# Patient Record
Sex: FEMALE | Race: BLACK OR AFRICAN AMERICAN | NOT HISPANIC OR LATINO | URBAN - METROPOLITAN AREA
[De-identification: names, ages, dates, MRNs, and addresses within clinical notes are randomized per-mention and may not be internally consistent; named-entity substitution may affect disease eponyms.]

---

## 2017-08-17 ENCOUNTER — EMERGENCY (EMERGENCY)
Facility: HOSPITAL | Age: 32
LOS: 1 days | Discharge: HOME | End: 2017-08-17
Attending: EMERGENCY MEDICINE | Admitting: EMERGENCY MEDICINE
Payer: COMMERCIAL

## 2017-08-17 VITALS
RESPIRATION RATE: 24 BRPM | SYSTOLIC BLOOD PRESSURE: 111 MMHG | HEART RATE: 85 BPM | TEMPERATURE: 98 F | DIASTOLIC BLOOD PRESSURE: 73 MMHG | OXYGEN SATURATION: 98 %

## 2017-08-17 VITALS
DIASTOLIC BLOOD PRESSURE: 68 MMHG | TEMPERATURE: 98 F | HEART RATE: 79 BPM | RESPIRATION RATE: 16 BRPM | SYSTOLIC BLOOD PRESSURE: 105 MMHG | OXYGEN SATURATION: 100 %

## 2017-08-17 DIAGNOSIS — O07.4 FAILED ATTEMPTED TERMINATION OF PREGNANCY WITHOUT COMPLICATION: ICD-10-CM

## 2017-08-17 LAB
ANION GAP SERPL CALC-SCNC: 16 MMOL/L — SIGNIFICANT CHANGE UP (ref 5–17)
APTT BLD: 29.3 SEC — SIGNIFICANT CHANGE UP (ref 27.5–37.4)
BASOPHILS # BLD AUTO: 0.1 K/UL — SIGNIFICANT CHANGE UP (ref 0–0.2)
BASOPHILS NFR BLD AUTO: 0.4 % — SIGNIFICANT CHANGE UP (ref 0–2)
BLD GP AB SCN SERPL QL: NEGATIVE — SIGNIFICANT CHANGE UP
BUN SERPL-MCNC: 16 MG/DL — SIGNIFICANT CHANGE UP (ref 7–23)
CALCIUM SERPL-MCNC: 10 MG/DL — SIGNIFICANT CHANGE UP (ref 8.4–10.5)
CHLORIDE SERPL-SCNC: 104 MMOL/L — SIGNIFICANT CHANGE UP (ref 96–108)
CO2 SERPL-SCNC: 19 MMOL/L — LOW (ref 22–31)
CREAT SERPL-MCNC: 0.8 MG/DL — SIGNIFICANT CHANGE UP (ref 0.5–1.3)
EOSINOPHIL # BLD AUTO: 0 K/UL — SIGNIFICANT CHANGE UP (ref 0–0.5)
EOSINOPHIL NFR BLD AUTO: 0.1 % — SIGNIFICANT CHANGE UP (ref 0–6)
GLUCOSE SERPL-MCNC: 114 MG/DL — HIGH (ref 70–99)
HCG SERPL-ACNC: 1511 MIU/ML — SIGNIFICANT CHANGE UP
HCT VFR BLD CALC: 32.3 % — LOW (ref 34.5–45)
HCT VFR BLD CALC: 37.8 % — SIGNIFICANT CHANGE UP (ref 34.5–45)
HGB BLD-MCNC: 10.9 G/DL — LOW (ref 11.5–15.5)
HGB BLD-MCNC: 12.3 G/DL — SIGNIFICANT CHANGE UP (ref 11.5–15.5)
INR BLD: 1.18 RATIO — HIGH (ref 0.88–1.16)
LYMPHOCYTES # BLD AUTO: 18.2 % — SIGNIFICANT CHANGE UP (ref 13–44)
LYMPHOCYTES # BLD AUTO: 2.3 K/UL — SIGNIFICANT CHANGE UP (ref 1–3.3)
MCHC RBC-ENTMCNC: 28.2 PG — SIGNIFICANT CHANGE UP (ref 27–34)
MCHC RBC-ENTMCNC: 29.3 PG — SIGNIFICANT CHANGE UP (ref 27–34)
MCHC RBC-ENTMCNC: 32.6 GM/DL — SIGNIFICANT CHANGE UP (ref 32–36)
MCHC RBC-ENTMCNC: 33.7 GM/DL — SIGNIFICANT CHANGE UP (ref 32–36)
MCV RBC AUTO: 86.7 FL — SIGNIFICANT CHANGE UP (ref 80–100)
MCV RBC AUTO: 87 FL — SIGNIFICANT CHANGE UP (ref 80–100)
MONOCYTES # BLD AUTO: 0.9 K/UL — SIGNIFICANT CHANGE UP (ref 0–0.9)
MONOCYTES NFR BLD AUTO: 7.2 % — SIGNIFICANT CHANGE UP (ref 2–14)
NEUTROPHILS # BLD AUTO: 9.4 K/UL — HIGH (ref 1.8–7.4)
NEUTROPHILS NFR BLD AUTO: 74.1 % — SIGNIFICANT CHANGE UP (ref 43–77)
PLATELET # BLD AUTO: 199 K/UL — SIGNIFICANT CHANGE UP (ref 150–400)
PLATELET # BLD AUTO: 237 K/UL — SIGNIFICANT CHANGE UP (ref 150–400)
POTASSIUM SERPL-MCNC: 4.2 MMOL/L — SIGNIFICANT CHANGE UP (ref 3.5–5.3)
POTASSIUM SERPL-SCNC: 4.2 MMOL/L — SIGNIFICANT CHANGE UP (ref 3.5–5.3)
PROTHROM AB SERPL-ACNC: 12.9 SEC — HIGH (ref 9.8–12.7)
RBC # BLD: 3.71 M/UL — LOW (ref 3.8–5.2)
RBC # BLD: 4.36 M/UL — SIGNIFICANT CHANGE UP (ref 3.8–5.2)
RBC # FLD: 13.1 % — SIGNIFICANT CHANGE UP (ref 10.3–14.5)
RBC # FLD: 13.1 % — SIGNIFICANT CHANGE UP (ref 10.3–14.5)
RH IG SCN BLD-IMP: POSITIVE — SIGNIFICANT CHANGE UP
RH IG SCN BLD-IMP: POSITIVE — SIGNIFICANT CHANGE UP
SODIUM SERPL-SCNC: 139 MMOL/L — SIGNIFICANT CHANGE UP (ref 135–145)
WBC # BLD: 12.7 K/UL — HIGH (ref 3.8–10.5)
WBC # BLD: 13.1 K/UL — HIGH (ref 3.8–10.5)
WBC # FLD AUTO: 12.7 K/UL — HIGH (ref 3.8–10.5)
WBC # FLD AUTO: 13.1 K/UL — HIGH (ref 3.8–10.5)

## 2017-08-17 PROCEDURE — 76817 TRANSVAGINAL US OBSTETRIC: CPT | Mod: 26

## 2017-08-17 PROCEDURE — 88305 TISSUE EXAM BY PATHOLOGIST: CPT | Mod: 26

## 2017-08-17 PROCEDURE — 85027 COMPLETE CBC AUTOMATED: CPT

## 2017-08-17 PROCEDURE — 86850 RBC ANTIBODY SCREEN: CPT

## 2017-08-17 PROCEDURE — 96374 THER/PROPH/DIAG INJ IV PUSH: CPT

## 2017-08-17 PROCEDURE — 99285 EMERGENCY DEPT VISIT HI MDM: CPT | Mod: 25

## 2017-08-17 PROCEDURE — 84702 CHORIONIC GONADOTROPIN TEST: CPT

## 2017-08-17 PROCEDURE — 85610 PROTHROMBIN TIME: CPT

## 2017-08-17 PROCEDURE — 99284 EMERGENCY DEPT VISIT MOD MDM: CPT | Mod: 25

## 2017-08-17 PROCEDURE — 80048 BASIC METABOLIC PNL TOTAL CA: CPT

## 2017-08-17 PROCEDURE — 86900 BLOOD TYPING SEROLOGIC ABO: CPT

## 2017-08-17 PROCEDURE — 86901 BLOOD TYPING SEROLOGIC RH(D): CPT

## 2017-08-17 PROCEDURE — 85730 THROMBOPLASTIN TIME PARTIAL: CPT

## 2017-08-17 PROCEDURE — 88305 TISSUE EXAM BY PATHOLOGIST: CPT

## 2017-08-17 PROCEDURE — 93975 VASCULAR STUDY: CPT

## 2017-08-17 PROCEDURE — 93975 VASCULAR STUDY: CPT | Mod: 26

## 2017-08-17 PROCEDURE — 96375 TX/PRO/DX INJ NEW DRUG ADDON: CPT

## 2017-08-17 PROCEDURE — 76817 TRANSVAGINAL US OBSTETRIC: CPT

## 2017-08-17 RX ORDER — ACETAMINOPHEN 500 MG
1000 TABLET ORAL ONCE
Qty: 0 | Refills: 0 | Status: COMPLETED | OUTPATIENT
Start: 2017-08-17 | End: 2017-08-17

## 2017-08-17 RX ORDER — SODIUM CHLORIDE 9 MG/ML
1000 INJECTION INTRAMUSCULAR; INTRAVENOUS; SUBCUTANEOUS ONCE
Qty: 0 | Refills: 0 | Status: COMPLETED | OUTPATIENT
Start: 2017-08-17 | End: 2017-08-17

## 2017-08-17 RX ORDER — MORPHINE SULFATE 50 MG/1
4 CAPSULE, EXTENDED RELEASE ORAL ONCE
Qty: 0 | Refills: 0 | Status: DISCONTINUED | OUTPATIENT
Start: 2017-08-17 | End: 2017-08-17

## 2017-08-17 RX ORDER — CEFAZOLIN SODIUM 1 G
1000 VIAL (EA) INJECTION ONCE
Qty: 0 | Refills: 0 | Status: COMPLETED | OUTPATIENT
Start: 2017-08-17 | End: 2017-08-17

## 2017-08-17 RX ORDER — MORPHINE SULFATE 50 MG/1
2 CAPSULE, EXTENDED RELEASE ORAL ONCE
Qty: 0 | Refills: 0 | Status: DISCONTINUED | OUTPATIENT
Start: 2017-08-17 | End: 2017-08-17

## 2017-08-17 RX ADMIN — Medication 1000 MILLIGRAM(S): at 02:33

## 2017-08-17 RX ADMIN — SODIUM CHLORIDE 1000 MILLILITER(S): 9 INJECTION INTRAMUSCULAR; INTRAVENOUS; SUBCUTANEOUS at 06:19

## 2017-08-17 RX ADMIN — MORPHINE SULFATE 2 MILLIGRAM(S): 50 CAPSULE, EXTENDED RELEASE ORAL at 01:43

## 2017-08-17 RX ADMIN — MORPHINE SULFATE 2 MILLIGRAM(S): 50 CAPSULE, EXTENDED RELEASE ORAL at 02:34

## 2017-08-17 RX ADMIN — SODIUM CHLORIDE 1000 MILLILITER(S): 9 INJECTION INTRAMUSCULAR; INTRAVENOUS; SUBCUTANEOUS at 01:43

## 2017-08-17 RX ADMIN — Medication 100 MILLIGRAM(S): at 06:38

## 2017-08-17 RX ADMIN — Medication 400 MILLIGRAM(S): at 01:44

## 2017-08-17 NOTE — ED PROVIDER NOTE - OBJECTIVE STATEMENT
33yo female  at 11w as per LMP p/w miscarriage and vaginal bleeding. Pt states [facility] obgyn stated she had blighted ovum on US 3w ago. C/o 10/10 midline suprapubic pain and cramping. Vaginal bleeding today, 1 pad, +large clot, possible tissue. No history of STDs or vaginal discharge.  Denies fever, chills, chest pain, dyspnea, palpitations, dizziness, weakness, recent cough, nausea, vomiting, diarrhea, bladder and bowel problems, leg swelling, sick contact, recent travel. 33yo female  at 11w as per LMP 10/29/17 p/w miscarriage and vaginal bleeding. Pt states [facility] obgyn stated she had blighted ovum on US 3w ago. C/o 10/10 midline suprapubic pain and cramping. Vaginal bleeding today, 1 pad, +large clot, possible tissue. No history of STDs or vaginal discharge.  Denies fever, chills, chest pain, dyspnea, palpitations, dizziness, weakness, recent cough, nausea, vomiting, diarrhea, bladder and bowel problems, leg swelling, sick contact, recent travel.

## 2017-08-17 NOTE — ED ADULT NURSE REASSESSMENT NOTE - NS ED NURSE REASSESS COMMENT FT1
patient returned from using the bathroom and reports that she passed a "large white/yellow clot". States she thinks she passed everything.

## 2017-08-17 NOTE — ED PROVIDER NOTE - ATTENDING CONTRIBUTION TO CARE
I was physically present for the E/M service provided. I agree with above history, physical, and plan which I have reviewed and edited where appropriate. I was physically present for the key portions of the service provided.    33yo female  at 11w as per LMP p/w vaginal bleeding. Dx with blighted ovum 3 weeks ago.  -Moderate distress due to pain, suprapubic TTP, abdomen soft  -r/o miscarriage vs retained products: US, T&S, H&H, Gyn c/s  -IVF and pain control

## 2017-08-17 NOTE — CONSULT NOTE ADULT - ASSESSMENT
33yo  at 11wks with incomplete     Patient is afebrile, hemodynamically stable, with minimal bleeding. No evidence of intrauterine products. Exam and imaging significant for retained endocervical product/blood. Patient counseled regarding management options, opts for cytotec.

## 2017-08-17 NOTE — ED ADULT NURSE REASSESSMENT NOTE - NS ED NURSE REASSESS COMMENT FT1
received report from Saranya DAO. pt resting comfortably in stretcher. A&Ox4. VSS. pt reports pain, 3/10 in abd, non radiating, cramping. heat packs provided for comfort. pt pending discharge. plan of care discussed. safety and comfort measures maintained.

## 2017-08-17 NOTE — ED ADULT NURSE REASSESSMENT NOTE - NS ED NURSE REASSESS COMMENT FT1
Pt discharged as per MD order, d/c instructions provided; pt verbalized understanding; VSS at time of discharge. IV lock removed. A&Ox4. Ambulating without difficulty.

## 2017-08-17 NOTE — ED PROVIDER NOTE - MEDICAL DECISION MAKING DETAILS
Frandy Byrd, PGY2: 32F  at 11w p/w abd cramping in setting of miscarriage. Morphine 2mg and tylenol. IVF. Labs. TVUS. Gyn consult

## 2017-08-17 NOTE — CONSULT NOTE ADULT - SUBJECTIVE AND OBJECTIVE BOX
HPI:    32y G_P_, LMP      presents with       OBHx:  GynHx:   PMH:  PSH:  All:  Meds:   SocialHx:     Vital Signs Last 24 Hrs  T(C): 36.7 (17 Aug 2017 01:38), Max: 36.7 (17 Aug 2017 01:38)  T(F): 98 (17 Aug 2017 01:38), Max: 98 (17 Aug 2017 01:38)  HR: 73 (17 Aug 2017 03:53) (73 - 85)  BP: 100/60 (17 Aug 2017 03:53) (100/60 - 111/73)  BP(mean): --  RR: 18 (17 Aug 2017 03:53) (18 - 24)  SpO2: 100% (17 Aug 2017 03:53) (98% - 100%)    PE:  Gen: Comfortable, NAD  CV: RRR  Pulm: CTAB  Abd: Soft, NT  Ext: No edema or tenderness bilaterally  Spec Exam:    LABS:                        12.3   12.7  )-----------( 237      ( 17 Aug 2017 01:47 )             37.8     08-17    139  |  104  |  16  ----------------------------<  114<H>  4.2   |  19<L>  |  0.80    Ca    10.0      17 Aug 2017 01:47      PT/INR - ( 17 Aug 2017 01:47 )   PT: 12.9 sec;   INR: 1.18 ratio         PTT - ( 17 Aug 2017 01:47 )  PTT:29.3 sec      RADIOLOGY & ADDITIONAL STUDIES:      A/P: 32y G P   who present with  -  -      So Lares PGY4 HPI:    32y  LMP 17 with known missed ab presenting to ED with       OBHx: G1  GynHx: reg menses, no f/c/abnl Paps or STIs  PMH:  PSH:  All:  Meds:   SocialHx:     Vital Signs Last 24 Hrs  T(C): 36.7 (17 Aug 2017 01:38), Max: 36.7 (17 Aug 2017 01:38)  T(F): 98 (17 Aug 2017 01:38), Max: 98 (17 Aug 2017 01:38)  HR: 73 (17 Aug 2017 03:53) (73 - 85)  BP: 100/60 (17 Aug 2017 03:53) (100/60 - 111/73)  BP(mean): --  RR: 18 (17 Aug 2017 03:53) (18 - 24)  SpO2: 100% (17 Aug 2017 03:53) (98% - 100%)    PE:  Gen: Comfortable, NAD  CV: RRR  Pulm: CTAB  Abd: Soft, NT  Ext: No edema or tenderness bilaterally  Spec Exam:    LABS:                        12.3   12.7  )-----------( 237      ( 17 Aug 2017 01:47 )             37.8     08-17    139  |  104  |  16  ----------------------------<  114<H>  4.2   |  19<L>  |  0.80    Ca    10.0      17 Aug 2017 01:47      PT/INR - ( 17 Aug 2017 01:47 )   PT: 12.9 sec;   INR: 1.18 ratio         PTT - ( 17 Aug 2017 01:47 )  PTT:29.3 sec      RADIOLOGY & ADDITIONAL STUDIES:      A/P: 32y G P   who present with  -  -      So Luda PGY4 HPI:    32y  @ 11wks by LMP 17 with known missed ab 3 weeks ago (~8wks) brought in by ambulance for vaginal bleeding and severe abdominal cramping. Patient was informed of blighted ovum diagnosis by sono 3 weeks ago, and she opted for medical management at the time. States that she was planning to take cytotec today; however, on her way home from work, she started bleeding heavily, felt lightheaded, and had to park on the [place]. She denies loss of consciousness but felt very weak at the time.  In ED, patient passed products in the bathroom, and reports significant pain relief afterwards. Bleeding is now comparable to a light period and she denies abdominal cramping. No fever, chills, nausea, vomiting, lightheadedness or dizziness.  This pregnancy was not planned but it was desired.  GYN Provider is Dr. Brent Herron ([facility])      OBHx: G1  GynHx: reg menses, no f/c/abnl Paps or STIs  PMH: none  PSH: none  All: NKDA  Meds: none  SocialHx: denies smoking, alcohol, or other drugs    Vital Signs Last 24 Hrs  T(C): 36.7 (17 Aug 2017 01:38), Max: 36.7 (17 Aug 2017 01:38)  T(F): 98 (17 Aug 2017 01:38), Max: 98 (17 Aug 2017 01:38)  HR: 73 (17 Aug 2017 03:53) (73 - 85)  BP: 100/60 (17 Aug 2017 03:53) (100/60 - 111/73)  BP(mean): --  RR: 18 (17 Aug 2017 03:53) (18 - 24)  SpO2: 100% (17 Aug 2017 03:53) (98% - 100%)    PE:  Gen: laying in bed comfortable  CV: RRR, no murmurs  Pulm: CTAB  Abd: normoactive bowel sounds, soft, nontender  Ext: No edema or tenderness bilaterally  Spec Exam: cervix visually dilated with products at os, sent to pathology. Small clots on Valsalva, light bleeding  VE: ~10cm uterus nontender, no CMT, cervix 1cm dilated, no adnexal fullness or tenderness    LABS:                        12.3   12.7  )-----------( 237      ( 17 Aug 2017 01:47 )             37.8     -    139  |  104  |  16  ----------------------------<  114<H>  4.2   |  19<L>  |  0.80    Ca    10.0      17 Aug 2017 01:47      PT/INR - ( 17 Aug 2017 01:47 )   PT: 12.9 sec;   INR: 1.18 ratio         PTT - ( 17 Aug 2017 01:47 )  PTT:29.3 sec      RADIOLOGY & ADDITIONAL STUDIES:    EXAM:  US OB TRANSVAGINAL                          EXAM:  US DPLX PELVIC                          PROCEDURE DATE:  2017        INTERPRETATION:  CLINICAL INFORMATION: 11 weeks pregnant with vaginal   bleeding and passage of tissue. Evaluate for retained products of   conception. Beta-hCG of 1511. Diagnosed with a blighted ovum 3 weeks ago.    LMP: May 29, 2017.    Gestational age based on LMP: 11 weeks and 3 days.    COMPARISON: None available.    TECHNIQUE: Transabdominal and Endovaginal pelvic sonogram. Color and   Spectral Doppler was performed.    FINDINGS:    Uterus: 9.9 x 8.2 x 7.2 cm. Multiple uterine myomas are identified which   are subserosal and intramural in location. For reference, the largest   subserosal anterior fibroid measures 3.7 x 4.3 x 4.4 cm. The cervix is   open and distended with heterogeneously echogenic material, which may   represent blood products.  Endometrium: Thickened measuring 22 mm. no discrete vascularity   identified utilizing color or spectral Doppler. No intrauterine   gestational sac visualized.    Right ovary: 1.8 x 1.2 x 1.8 cm. Within normal limits. Blood flow is   demonstrated utilizing color and spectral Doppler. Color spectral Doppler   flow.  Left ovary: 3.2 x 1.5 x 2.1 cm, inclusive of a 1.3 x 1.2 x 0.9 cm corpus   luteum. Ovarian blood flow is demonstrated utilizing color and spectral   Doppler.  Free fluid: Trace right adnexal free fluid.    IMPRESSION:    No visualized intrauterine gestation. Heterogeneously thickened   endometrium and endocervical canal distended with heterogeneously   echogenic material, which may represent blood products. Findings may be   seen in the setting of spontaneous . Serial beta-hCG should be   obtained to ensure proper downtrending. R2 GYN Consult Note    32y  @ 11wks by LMP 17 with known missed ab 3 weeks ago (~8wks) brought in by ambulance for vaginal bleeding and severe abdominal cramping. Patient was informed of blighted ovum diagnosis by sono 3 weeks ago, and she opted for medical management at the time. States that she was planning to take cytotec today; however, on her way home from work, she started bleeding heavily, felt lightheaded, and had to park on the [place] bridge. She denies loss of consciousness but felt very weak at the time.  In ED, patient passed products in the bathroom, and reports significant pain relief afterwards. Bleeding is now comparable to a light period and she denies abdominal cramping. No fever, chills, nausea, vomiting, lightheadedness or dizziness.  This pregnancy was not planned but it was desired.  GYN Provider is Dr. Brent Herron ([facility])      OBHx: G1  GynHx: reg menses, no f/c/abnl Paps or STIs  PMH: none  PSH: none  All: NKDA  Meds: none  SocialHx: denies smoking, alcohol, or other drugs    Vital Signs Last 24 Hrs  T(C): 36.7 (17 Aug 2017 01:38), Max: 36.7 (17 Aug 2017 01:38)  T(F): 98 (17 Aug 2017 01:38), Max: 98 (17 Aug 2017 01:38)  HR: 73 (17 Aug 2017 03:53) (73 - 85)  BP: 100/60 (17 Aug 2017 03:53) (100/60 - 111/73)  BP(mean): --  RR: 18 (17 Aug 2017 03:53) (18 - 24)  SpO2: 100% (17 Aug 2017 03:53) (98% - 100%)    PE:  Gen: laying in bed comfortable  CV: RRR, no murmurs  Pulm: CTAB  Abd: normoactive bowel sounds, soft, nontender  Ext: No edema or tenderness bilaterally  Spec Exam: cervix visually dilated with products at os, sent to pathology. Small clots on Valsalva, light bleeding  VE: ~10cm uterus nontender, no CMT, cervix 1cm dilated, no adnexal fullness or tenderness    LABS:                        12.3   12.7  )-----------( 237      ( 17 Aug 2017 01:47 )             37.8     08-    139  |  104  |  16  ----------------------------<  114<H>  4.2   |  19<L>  |  0.80    Ca    10.0      17 Aug 2017 01:47      PT/INR - ( 17 Aug 2017 01:47 )   PT: 12.9 sec;   INR: 1.18 ratio         PTT - ( 17 Aug 2017 01:47 )  PTT:29.3 sec      RADIOLOGY & ADDITIONAL STUDIES:    EXAM:  US OB TRANSVAGINAL                          EXAM:  US DPLX PELVIC                          PROCEDURE DATE:  2017        INTERPRETATION:  CLINICAL INFORMATION: 11 weeks pregnant with vaginal   bleeding and passage of tissue. Evaluate for retained products of   conception. Beta-hCG of 1511. Diagnosed with a blighted ovum 3 weeks ago.    LMP: May 29, 2017.    Gestational age based on LMP: 11 weeks and 3 days.    COMPARISON: None available.    TECHNIQUE: Transabdominal and Endovaginal pelvic sonogram. Color and   Spectral Doppler was performed.    FINDINGS:    Uterus: 9.9 x 8.2 x 7.2 cm. Multiple uterine myomas are identified which   are subserosal and intramural in location. For reference, the largest   subserosal anterior fibroid measures 3.7 x 4.3 x 4.4 cm. The cervix is   open and distended with heterogeneously echogenic material, which may   represent blood products.  Endometrium: Thickened measuring 22 mm. no discrete vascularity   identified utilizing color or spectral Doppler. No intrauterine   gestational sac visualized.    Right ovary: 1.8 x 1.2 x 1.8 cm. Within normal limits. Blood flow is   demonstrated utilizing color and spectral Doppler. Color spectral Doppler   flow.  Left ovary: 3.2 x 1.5 x 2.1 cm, inclusive of a 1.3 x 1.2 x 0.9 cm corpus   luteum. Ovarian blood flow is demonstrated utilizing color and spectral   Doppler.  Free fluid: Trace right adnexal free fluid.    IMPRESSION:    No visualized intrauterine gestation. Heterogeneously thickened   endometrium and endocervical canal distended with heterogeneously   echogenic material, which may represent blood products. Findings may be   seen in the setting of spontaneous . Serial beta-hCG should be   obtained to ensure proper downtrending.

## 2017-08-17 NOTE — ED ADULT NURSE NOTE - OBJECTIVE STATEMENT
33 y/o female, , 11 weeks pregnant, BIBA c/o vaginal bleeding. Patient states she has been spotting for the past 4-5 days. Reports tonight about 45 minutes prior to arrival she had sudden sharp/stabbing pain to her lower abdomen and "a big gush of blood". Patient states she had a recent sonogram and was told she has a "blighted ovum" and was aware that she was going to have a miscarriage eventually. Patient presents to ED visibly uncomfortable, shaking and c/o sever pain. Denies dizziness, N/V, vision changes, weakness, fever, chills. States she recalls passing a large "blood clot" prior to arrival. Lungs clear b/l. Abdomen soft, +tenderness with palpation of lower abdomen b/l. MD at bedside for eval. Safety and comfort maintained.

## 2017-08-17 NOTE — ED PROVIDER NOTE - CARE PLAN
Principal Discharge DX:	Incomplete  without complication  Instructions for follow-up, activity and diet:	1. Follow up with your ob/gyn within 2-3days for reevaluation.  2.  Return to the Emergency Department for worsening, progressive or any other concerning symptoms.

## 2017-08-17 NOTE — ED PROVIDER NOTE - PLAN OF CARE
1. Follow up with your ob/gyn within 2-3days for reevaluation.  2.  Return to the Emergency Department for worsening, progressive or any other concerning symptoms.

## 2017-08-17 NOTE — ED PROVIDER NOTE - PROGRESS NOTE DETAILS
Pt passed conception tissue and clots while in bathroom, majority of which was flushed. I was shown the last tissue that passed and it is consistent with products of conception. Pt no longer in pain. Gyn evaluated patient, recommending cytotec and repeat cbc. If h/h stable, discharge with obgyn followup Patient seen by private ob/gyn at bedside, OK for DC home. Sylvia Malone DO

## 2017-08-18 LAB — CHROM ANALY OVERALL INTERP SPEC-IMP: SIGNIFICANT CHANGE UP

## 2017-09-01 LAB — SURGICAL PATHOLOGY STUDY: SIGNIFICANT CHANGE UP

## 2018-02-03 ENCOUNTER — EMERGENCY (EMERGENCY)
Facility: HOSPITAL | Age: 33
LOS: 1 days | Discharge: HOME | End: 2018-02-03
Attending: EMERGENCY MEDICINE | Admitting: EMERGENCY MEDICINE
Payer: COMMERCIAL

## 2018-02-03 VITALS
DIASTOLIC BLOOD PRESSURE: 76 MMHG | RESPIRATION RATE: 18 BRPM | HEART RATE: 112 BPM | SYSTOLIC BLOOD PRESSURE: 125 MMHG | HEIGHT: 63 IN | OXYGEN SATURATION: 100 % | WEIGHT: 156.97 LBS | TEMPERATURE: 99 F

## 2018-02-03 VITALS
SYSTOLIC BLOOD PRESSURE: 99 MMHG | DIASTOLIC BLOOD PRESSURE: 64 MMHG | RESPIRATION RATE: 17 BRPM | OXYGEN SATURATION: 100 % | TEMPERATURE: 98 F | HEART RATE: 102 BPM

## 2018-02-03 DIAGNOSIS — R10.9 UNSPECIFIED ABDOMINAL PAIN: ICD-10-CM

## 2018-02-03 LAB
ALBUMIN SERPL ELPH-MCNC: 4.2 G/DL — SIGNIFICANT CHANGE UP (ref 3.3–5)
ALP SERPL-CCNC: 68 U/L — SIGNIFICANT CHANGE UP (ref 40–120)
ALT FLD-CCNC: 9 U/L RC — LOW (ref 10–45)
ANION GAP SERPL CALC-SCNC: 14 MMOL/L — SIGNIFICANT CHANGE UP (ref 5–17)
APPEARANCE UR: CLEAR — SIGNIFICANT CHANGE UP
AST SERPL-CCNC: 17 U/L — SIGNIFICANT CHANGE UP (ref 10–40)
BACTERIA # UR AUTO: ABNORMAL /HPF
BASE EXCESS BLDV CALC-SCNC: -1.1 MMOL/L — SIGNIFICANT CHANGE UP (ref -2–2)
BASOPHILS # BLD AUTO: 0 K/UL — SIGNIFICANT CHANGE UP (ref 0–0.2)
BASOPHILS NFR BLD AUTO: 0.1 % — SIGNIFICANT CHANGE UP (ref 0–2)
BILIRUB SERPL-MCNC: 0.2 MG/DL — SIGNIFICANT CHANGE UP (ref 0.2–1.2)
BILIRUB UR-MCNC: NEGATIVE — SIGNIFICANT CHANGE UP
BLD GP AB SCN SERPL QL: NEGATIVE — SIGNIFICANT CHANGE UP
BUN SERPL-MCNC: 14 MG/DL — SIGNIFICANT CHANGE UP (ref 7–23)
CA-I SERPL-SCNC: 1.22 MMOL/L — SIGNIFICANT CHANGE UP (ref 1.12–1.3)
CALCIUM SERPL-MCNC: 9.5 MG/DL — SIGNIFICANT CHANGE UP (ref 8.4–10.5)
CHLORIDE BLDV-SCNC: 107 MMOL/L — SIGNIFICANT CHANGE UP (ref 96–108)
CHLORIDE SERPL-SCNC: 103 MMOL/L — SIGNIFICANT CHANGE UP (ref 96–108)
CO2 BLDV-SCNC: 26 MMOL/L — SIGNIFICANT CHANGE UP (ref 22–30)
CO2 SERPL-SCNC: 23 MMOL/L — SIGNIFICANT CHANGE UP (ref 22–31)
COLOR SPEC: YELLOW — SIGNIFICANT CHANGE UP
CREAT SERPL-MCNC: 0.65 MG/DL — SIGNIFICANT CHANGE UP (ref 0.5–1.3)
DIFF PNL FLD: ABNORMAL
EOSINOPHIL # BLD AUTO: 0 K/UL — SIGNIFICANT CHANGE UP (ref 0–0.5)
EOSINOPHIL NFR BLD AUTO: 0 % — SIGNIFICANT CHANGE UP (ref 0–6)
EPI CELLS # UR: SIGNIFICANT CHANGE UP /HPF
GAS PNL BLDV: 137 MMOL/L — SIGNIFICANT CHANGE UP (ref 136–145)
GAS PNL BLDV: SIGNIFICANT CHANGE UP
GAS PNL BLDV: SIGNIFICANT CHANGE UP
GLUCOSE BLDV-MCNC: 83 MG/DL — SIGNIFICANT CHANGE UP (ref 70–99)
GLUCOSE SERPL-MCNC: 87 MG/DL — SIGNIFICANT CHANGE UP (ref 70–99)
GLUCOSE UR QL: NEGATIVE — SIGNIFICANT CHANGE UP
HCG SERPL-ACNC: HIGH MIU/ML (ref 5–24)
HCO3 BLDV-SCNC: 24 MMOL/L — SIGNIFICANT CHANGE UP (ref 21–29)
HCT VFR BLD CALC: 35.9 % — SIGNIFICANT CHANGE UP (ref 34.5–45)
HCT VFR BLDA CALC: 38 % — LOW (ref 39–50)
HGB BLD CALC-MCNC: 12.3 G/DL — SIGNIFICANT CHANGE UP (ref 11.5–15.5)
HGB BLD-MCNC: 12.5 G/DL — SIGNIFICANT CHANGE UP (ref 11.5–15.5)
KETONES UR-MCNC: ABNORMAL
LACTATE BLDV-MCNC: 0.9 MMOL/L — SIGNIFICANT CHANGE UP (ref 0.7–2)
LEUKOCYTE ESTERASE UR-ACNC: NEGATIVE — SIGNIFICANT CHANGE UP
LYMPHOCYTES # BLD AUTO: 1.3 K/UL — SIGNIFICANT CHANGE UP (ref 1–3.3)
LYMPHOCYTES # BLD AUTO: 9.3 % — LOW (ref 13–44)
MCHC RBC-ENTMCNC: 30 PG — SIGNIFICANT CHANGE UP (ref 27–34)
MCHC RBC-ENTMCNC: 34.8 GM/DL — SIGNIFICANT CHANGE UP (ref 32–36)
MCV RBC AUTO: 86.1 FL — SIGNIFICANT CHANGE UP (ref 80–100)
MONOCYTES # BLD AUTO: 0.9 K/UL — SIGNIFICANT CHANGE UP (ref 0–0.9)
MONOCYTES NFR BLD AUTO: 6.9 % — SIGNIFICANT CHANGE UP (ref 2–14)
NEUTROPHILS # BLD AUTO: 11.4 K/UL — HIGH (ref 1.8–7.4)
NEUTROPHILS NFR BLD AUTO: 83.7 % — HIGH (ref 43–77)
NITRITE UR-MCNC: NEGATIVE — SIGNIFICANT CHANGE UP
OTHER CELLS CSF MANUAL: 6 ML/DL — LOW (ref 18–22)
PCO2 BLDV: 46 MMHG — SIGNIFICANT CHANGE UP (ref 35–50)
PH BLDV: 7.34 — LOW (ref 7.35–7.45)
PH UR: 6 — SIGNIFICANT CHANGE UP (ref 5–8)
PLATELET # BLD AUTO: 216 K/UL — SIGNIFICANT CHANGE UP (ref 150–400)
PO2 BLDV: 23 MMHG — LOW (ref 25–45)
POTASSIUM BLDV-SCNC: 3.4 MMOL/L — LOW (ref 3.5–5)
POTASSIUM SERPL-MCNC: 3.7 MMOL/L — SIGNIFICANT CHANGE UP (ref 3.5–5.3)
POTASSIUM SERPL-SCNC: 3.7 MMOL/L — SIGNIFICANT CHANGE UP (ref 3.5–5.3)
PROT SERPL-MCNC: 7.9 G/DL — SIGNIFICANT CHANGE UP (ref 6–8.3)
PROT UR-MCNC: SIGNIFICANT CHANGE UP
RBC # BLD: 4.17 M/UL — SIGNIFICANT CHANGE UP (ref 3.8–5.2)
RBC # FLD: 13.8 % — SIGNIFICANT CHANGE UP (ref 10.3–14.5)
RBC CASTS # UR COMP ASSIST: ABNORMAL /HPF (ref 0–2)
RH IG SCN BLD-IMP: POSITIVE — SIGNIFICANT CHANGE UP
SAO2 % BLDV: 33 % — LOW (ref 67–88)
SODIUM SERPL-SCNC: 140 MMOL/L — SIGNIFICANT CHANGE UP (ref 135–145)
SP GR SPEC: 1.02 — SIGNIFICANT CHANGE UP (ref 1.01–1.02)
UROBILINOGEN FLD QL: NEGATIVE — SIGNIFICANT CHANGE UP
WBC # BLD: 13.6 K/UL — HIGH (ref 3.8–10.5)
WBC # FLD AUTO: 13.6 K/UL — HIGH (ref 3.8–10.5)
WBC UR QL: SIGNIFICANT CHANGE UP /HPF (ref 0–5)

## 2018-02-03 PROCEDURE — 86850 RBC ANTIBODY SCREEN: CPT

## 2018-02-03 PROCEDURE — 84702 CHORIONIC GONADOTROPIN TEST: CPT

## 2018-02-03 PROCEDURE — 99284 EMERGENCY DEPT VISIT MOD MDM: CPT | Mod: 25

## 2018-02-03 PROCEDURE — 82947 ASSAY GLUCOSE BLOOD QUANT: CPT

## 2018-02-03 PROCEDURE — 85014 HEMATOCRIT: CPT

## 2018-02-03 PROCEDURE — 76830 TRANSVAGINAL US NON-OB: CPT

## 2018-02-03 PROCEDURE — 85027 COMPLETE CBC AUTOMATED: CPT

## 2018-02-03 PROCEDURE — 84132 ASSAY OF SERUM POTASSIUM: CPT

## 2018-02-03 PROCEDURE — 76856 US EXAM PELVIC COMPLETE: CPT

## 2018-02-03 PROCEDURE — 82435 ASSAY OF BLOOD CHLORIDE: CPT

## 2018-02-03 PROCEDURE — 80053 COMPREHEN METABOLIC PANEL: CPT

## 2018-02-03 PROCEDURE — 82330 ASSAY OF CALCIUM: CPT

## 2018-02-03 PROCEDURE — 76830 TRANSVAGINAL US NON-OB: CPT | Mod: 26

## 2018-02-03 PROCEDURE — 86900 BLOOD TYPING SEROLOGIC ABO: CPT

## 2018-02-03 PROCEDURE — 96376 TX/PRO/DX INJ SAME DRUG ADON: CPT

## 2018-02-03 PROCEDURE — 81001 URINALYSIS AUTO W/SCOPE: CPT

## 2018-02-03 PROCEDURE — 76856 US EXAM PELVIC COMPLETE: CPT | Mod: 26

## 2018-02-03 PROCEDURE — 84295 ASSAY OF SERUM SODIUM: CPT

## 2018-02-03 PROCEDURE — 83605 ASSAY OF LACTIC ACID: CPT

## 2018-02-03 PROCEDURE — 96374 THER/PROPH/DIAG INJ IV PUSH: CPT

## 2018-02-03 PROCEDURE — 82803 BLOOD GASES ANY COMBINATION: CPT

## 2018-02-03 PROCEDURE — 99285 EMERGENCY DEPT VISIT HI MDM: CPT

## 2018-02-03 PROCEDURE — 86901 BLOOD TYPING SEROLOGIC RH(D): CPT

## 2018-02-03 RX ORDER — INDOMETHACIN 50 MG
1 CAPSULE ORAL
Qty: 9 | Refills: 0 | OUTPATIENT
Start: 2018-02-03 | End: 2018-02-05

## 2018-02-03 RX ORDER — MORPHINE SULFATE 50 MG/1
2 CAPSULE, EXTENDED RELEASE ORAL ONCE
Qty: 0 | Refills: 0 | Status: DISCONTINUED | OUTPATIENT
Start: 2018-02-03 | End: 2018-02-03

## 2018-02-03 RX ORDER — KETOROLAC TROMETHAMINE 30 MG/ML
15 SYRINGE (ML) INJECTION ONCE
Qty: 0 | Refills: 0 | Status: DISCONTINUED | OUTPATIENT
Start: 2018-02-03 | End: 2018-02-03

## 2018-02-03 RX ORDER — SODIUM CHLORIDE 9 MG/ML
1000 INJECTION INTRAMUSCULAR; INTRAVENOUS; SUBCUTANEOUS ONCE
Qty: 0 | Refills: 0 | Status: COMPLETED | OUTPATIENT
Start: 2018-02-03 | End: 2018-02-03

## 2018-02-03 RX ADMIN — Medication 15 MILLIGRAM(S): at 17:45

## 2018-02-03 RX ADMIN — Medication 15 MILLIGRAM(S): at 23:38

## 2018-02-03 RX ADMIN — Medication 15 MILLIGRAM(S): at 19:36

## 2018-02-03 RX ADMIN — SODIUM CHLORIDE 1000 MILLILITER(S): 9 INJECTION INTRAMUSCULAR; INTRAVENOUS; SUBCUTANEOUS at 17:45

## 2018-02-03 RX ADMIN — Medication 15 MILLIGRAM(S): at 21:35

## 2018-02-03 NOTE — CONSULT NOTE ADULT - SUBJECTIVE AND OBJECTIVE BOX
CHAD ESQUIVEL  33y  Female [id number]    HPI:  34 y/o  s/p D&C 18 for elective termination at 12wk here for abdominal pain.  Patient states she woke up at 230 am in severe crampy abdominal pain.  States previous to that she only had mild pain.  Described pain as crampy, 10/10 in intensity.  States she took 400 mg of advil only once pain got "so bad she could no longer take it."       Name of GYN Physician:     POB:      Pgyn: Denies fibroids, cysts, endometriosis, STI's, Abnormal pap smears     Home meds:     Hospital Meds:   MEDICATIONS  (STANDING):    MEDICATIONS  (PRN):      Allergies    No Known Allergies    Intolerances        PAST MEDICAL & SURGICAL HISTORY:  No pertinent past medical history  No significant past surgical history      FAMILY HISTORY:  No pertinent family history in first degree relatives      Social History:  Denies smoking use, drug use, alcohol use.   +occasional social alcohol use    Vital Signs Last 24 Hrs  T(C): 36.7 (2018 16:42), Max: 37.1 (2018 16:03)  T(F): 98 (2018 16:42), Max: 98.7 (2018 16:03)  HR: 107 (2018 16:42) (107 - 112)  BP: 123/73 (2018 16:42) (123/73 - 125/76)  BP(mean): --  RR: 16 (2018 16:42) (16 - 18)  SpO2: 100% (2018 16:42) (100% - 100%)    Physical Exam:   General: sitting comftorably in bed, NAD   HEENT: neck supple, full ROM  CV: RR S1S2 no m/r/g  Lungs: CTA b/l, good air flow b/l   Back: No CVA tenderness  Abd: Soft, non-tender, non-distended.  Bowel sounds present.    :  No bleeding on pad.    External labia wnl.  Bimanual exam with no cervical motion tenderness, uterus wnl, adnexa non palpable b/l.  Cervix closed vs. Cervix dilated    cm   Speculum Exam: No active bleeding from os.  Physiologic discharge.    Ext: non-tender b/l, no edema     LABS:                              12.5   13.6  )-----------( 216      ( 2018 17:18 )             35.9           I&O's Detail          RADIOLOGY & ADDITIONAL STUDIES: CHAD ESQUIVEL  33y  Female [id number]    HPI:  32 y/o  s/p D&C 18 for elective termination at 12wk here for abdominal pain.  Patient states she woke up at 230 am in severe crampy abdominal pain.  States previous to that she only had mild pain.  Described pain as crampy, 10/10 in intensity.  States she took 400 mg of advil only once pain got "so bad she could no longer take it."  Reports bloating with the pain.  Denies fever, chills, n/v, diarrhea, constipation, urinary pain, burning, hesitancy.  States vaginal bleeding is mild- 1 pad per day.  Denies purulent vaginal discharge.    [facility] [place].      Name of GYN Physician: Dr. Herron    POB:  etop x 1, sab x 1     Pgyn: Denies cysts, endometriosis, STI's, Abnormal pap smears.  +fibroids dx after sab.     Home meds: denies     Allergies    No Known Allergies    Intolerances        PAST MEDICAL & SURGICAL HISTORY:  No pertinent past medical history  No significant past surgical history      FAMILY HISTORY:  No pertinent family history in first degree relatives      Social History:  Denies smoking use, drug use, alcohol use.       Vital Signs Last 24 Hrs  T(C): 36.7 (2018 16:42), Max: 37.1 (2018 16:03)  T(F): 98 (2018 16:42), Max: 98.7 (2018 16:03)  HR: 107 (2018 16:42) (107 - 112)  BP: 123/73 (2018 16:42) (123/73 - 125/76)  BP(mean): --  RR: 16 (2018 16:42) (16 - 18)  SpO2: 100% (2018 16:42) (100% - 100%)    Physical Exam:   General: sitting comfortably in bed, NAD   HEENT: neck supple, full ROM  Back: No CVA tenderness  Abd: Soft, tender to palpation in RLQ- no rebound or guarding  Bowel sounds present.    :  No bleeding on pad.    External labia wnl.  Bimanual exam with no cervical motion tenderness, uterus wnl, R adnexal and suprapubic tenderness.  Cervix closed  Speculum Exam: No active bleeding from os.  Old blood in vault.     Ext: non-tender b/l, no edema     LABS:                              12.5   13.6  )-----------( 216      ( 2018 17:18 )             35.9           I&O's Detail          RADIOLOGY & ADDITIONAL STUDIES:    Bedsdie sono performed: thin endometrial stripe.  large R pedunculated fibroid extending into RLQ with pain in taht area upon sonogram.

## 2018-02-03 NOTE — CONSULT NOTE ADULT - ASSESSMENT
34 y/o  s/p eTOP with D&C on  here for cramping s/p D&C.  Low concern for infection given low grade leukocytosis (normal in setting of recent instrumentation), lack of purulent discharge and lack of fever.  Likely cramping 2/2 uterine contractions s/p procedure w/ large fibroid uterus.

## 2018-02-03 NOTE — CONSULT NOTE ADULT - PROBLEM SELECTOR RECOMMENDATION 9
-f/u TVUS   -UA to assess UTI given suprapubic tenderness.   -IV fluid bolus given tachycardia  -GYN will follow     Guadalupe Negron PGY-2   d/w Dr. Herron

## 2018-02-03 NOTE — ED ADULT NURSE REASSESSMENT NOTE - NS ED NURSE REASSESS COMMENT FT1
Patient resting and comfortable. Patient reports pain in lower abdominal area. MD aware. VSS. Safety and comfort measures provided and maintained. Pending US.
1952 pt resting comfortably pending sono results/vss no distress/lab sent/gcruz

## 2018-02-03 NOTE — ED PROVIDER NOTE - MEDICAL DECISION MAKING DETAILS
34 y/o female h/o fibroids presenting with lower abdominal pain. pt s/p d&c a few days ago. on exam pt with lower abdominal pain. negaitve mcburrneys and negative psoas and history atypical for appendicitis. pocus shows multiple fibroids without evidence of sig free fluid. d/w ob, who suspect pain likely normal post op pain from d7\&C as well as seconary to large fibroids. will pain control and order tvus to further evaluate

## 2018-02-03 NOTE — ED PROVIDER NOTE - PROGRESS NOTE DETAILS
Attd:  Received sign out on patient pending TVUS and OBGYN opinion.  Full read of ultrasound pending at this time, however patient feeling better and very much desiring to go home.  Discussed with OBGYN - they reviewed US with radiology - only seeing fibroid, no concern for RPOC, recommending indomethacin for pain control.  Discussed with patient that final attending read of US pending and based off final read she may need to return to the hospital, she is comfortable going home with this still pending - well appearing, believe this is reasonable, will discharge with OBGYN follow up.  Reddy Camargo M.D.

## 2018-02-03 NOTE — ED ADULT NURSE NOTE - OBJECTIVE STATEMENT
32 y/o F patient presents to ED c/o of lower abdominal pain that started yesterday at 0230 in the morning. patient states she had an elective  Thursday and pain has worsened since then. Patient states she was 12 weeks pregnant at the time. Patient reports difficulty sitting down due to lower back pain. PMH of miscarriage last August at 11 weeks. 34 y/o F patient presents to ED c/o of lower abdominal pain that started yesterday at 0230 in the morning. patient states she had an elective  Thursday and pain has worsened since then. Patient states she was 12 weeks pregnant at the time. Patient reports difficulty sitting down due to lower back pain. Patient states she goes through one saturated pad a day. PMH of miscarriage last August at 11 weeks. Upon assessment, patient is A&Ox3. speech clear and coherent. skin warm dry and intact. cap refill <3 sec. abdomen soft and non distended. tenderness noted in lower abdominal area. ambulatory. Patient denies HA, dizziness, SOB, chest pain, N/V/D, bowel/bladder changes, dysuria, numbness/tingling, fevers/chills, cough. LMP- November 10. Last BM- 0500. VSS. Safety and comfort measures provided 34 y/o F patient presents to ED c/o of lower abdominal pain that started yesterday at 0230 in the morning. patient states she had an elective  Thursday and pain has worsened since then. Patient states she was 12 weeks pregnant at the time. Patient reports difficulty sitting down due to lower back pain. Patient states she goes through one saturated pad a day. PMH of miscarriage last August at 11 weeks. Upon assessment, patient is A&Ox3. speech clear and coherent. skin warm dry and intact. cap refill <3 sec. abdomen soft and non distended. tenderness noted in lower abdominal area. ambulatory. Patient denies HA, dizziness, SOB, chest pain, N/V/D, bowel/bladder changes, dysuria, numbness/tingling, fevers/chills, cough. LMP- November 10. Last BM- 0500. VSS. Safety and comfort measures provided. MD at the bedside.

## 2018-02-03 NOTE — ED PROVIDER NOTE - CARE PLAN
Principal Discharge DX:	Abdominal pain Principal Discharge DX:	Abdominal pain  Secondary Diagnosis:	Fibroid

## 2018-08-17 NOTE — ED PROVIDER NOTE - NS ED MD DISPO DISCHARGE
[facility]  Brief Operative Note     SUMMARY     Surgery Date: 8/17/2018     Surgeon(s) and Role:     * Evgeny Vicente MD - Primary    Assisting Surgeon: None    Pre-op Diagnosis:  Bleeding hemorrhoids [K64.9]    Post-op Diagnosis:  Post-Op Diagnosis Codes:     * Bleeding hemorrhoids [K64.9]    Procedure(s) (LRB):  HEMORRHOIDECTOMY (N/A)    Anesthesia: General    Description of the findings of the procedure: hemorrhoidectomy    Findings/Key Components: see op note    Estimated Blood Loss: * No values recorded between 8/17/2018  7:39 AM and 8/17/2018  8:27 AM *         Specimens:   Specimen (12h ago, onward)    Start     Ordered    08/17/18 0752  Specimen to Pathology - Surgery  Once     Comments:  1.)  Hemorrhoid, at 10oclock 2.)  Hemorrhoid, at 7ymcaiw2.)  Hemorrhoid, at 3oclockDX:  Bleeding hemorrhoids     Start Status   08/17/18 0752 Collected (08/17/18 0808)       08/17/18 0807          Discharge Note    SUMMARY     Admit Date: 8/17/2018    Discharge Date and Time:  08/17/2018 8:27 AM    Hospital Course The patient underwent hemorrhoidectomy and was discharged post op.    Final Diagnosis: Post-Op Diagnosis Codes:     * Bleeding hemorrhoids [K64.9]    Disposition: Home or Self Care    Follow Up/Patient Instructions:     Medications:  Reconciled Home Medications:      Medication List      START taking these medications    HYDROcodone-acetaminophen 5-325 mg per tablet  Commonly known as:  NORCO  Take 2 tablets by mouth every 4 (four) hours as needed for Pain.     senna-docusate 8.6-50 mg 8.6-50 mg per tablet  Commonly known as:  PERICOLACE  Take 1 tablet by mouth 2 (two) times daily.        CONTINUE taking these medications    fluticasone 50 mcg/actuation nasal spray  Commonly known as:  FLONASE  2 sprays by Each Nare route once daily.     hydrocortisone-pramoxine rectal foam  Commonly known as:  PROCTOFOAM HC  PLACE 1 APPLICATOR RECTALLY 2 (TWO) TIMES DAILY.     levothyroxine 137 MCG Tab  tablet  Commonly known as:  SYNTHROID  Take 137 mcg by mouth before breakfast.     valsartan-hydrochlorothiazide 80-12.5 mg per tablet  Commonly known as:  DIOVAN-HCT  Take 1 tablet by mouth once daily.          Discharge Procedure Orders   Diet general     Call MD for:  temperature >100.4     Call MD for:  persistent nausea and vomiting     Call MD for:  severe uncontrolled pain     Call MD for:  difficulty breathing, headache or visual disturbances     Call MD for:  redness, tenderness, or signs of infection (pain, swelling, redness, odor or green/yellow discharge around incision site)     Call MD for:  hives     Call MD for:  persistent dizziness or light-headedness     Call MD for:  extreme fatigue     Remove dressing in 48 hours     Activity as tolerated        Home

## 2019-08-03 NOTE — ED PROVIDER NOTE - OBJECTIVE STATEMENT
33 year old female  with pmhx of fibroids presents with abdominal pain radiating to the back and light vaginal bleeding onset 2:30 am this morning. Pain woke patient up from sleep this morning. Had similar pain in August when she had a miscarriage. Patient was 12 weeks pregnant. Had recent D&C  on Thursday at [facility]. Denies fever but has chills. Denies nausea and vomiting. Non smoker. Took 2 - 500 mg Tylenol for pain with no relief.  OB/[facility]N- [facility]